# Patient Record
Sex: FEMALE | Race: WHITE | URBAN - NONMETROPOLITAN AREA
[De-identification: names, ages, dates, MRNs, and addresses within clinical notes are randomized per-mention and may not be internally consistent; named-entity substitution may affect disease eponyms.]

---

## 2022-05-10 ENCOUNTER — HOSPITAL ENCOUNTER (EMERGENCY)
Facility: HOSPITAL | Age: 52
Discharge: HOME | End: 2022-05-10
Attending: NURSE PRACTITIONER | Admitting: NURSE PRACTITIONER
Payer: COMMERCIAL

## 2022-05-10 VITALS
OXYGEN SATURATION: 95 % | DIASTOLIC BLOOD PRESSURE: 64 MMHG | SYSTOLIC BLOOD PRESSURE: 93 MMHG | TEMPERATURE: 98.9 F | RESPIRATION RATE: 16 BRPM | HEART RATE: 93 BPM

## 2022-05-10 DIAGNOSIS — J02.0 STREPTOCOCCAL SORE THROAT: ICD-10-CM

## 2022-05-10 LAB — GROUP A STREP BY PCR: DETECTED

## 2022-05-10 PROCEDURE — 99213 OFFICE O/P EST LOW 20 MIN: CPT | Performed by: NURSE PRACTITIONER

## 2022-05-10 PROCEDURE — 87651 STREP A DNA AMP PROBE: CPT | Performed by: NURSE PRACTITIONER

## 2022-05-10 PROCEDURE — G0463 HOSPITAL OUTPT CLINIC VISIT: HCPCS

## 2022-05-10 PROCEDURE — 87651 STREP A DNA AMP PROBE: CPT | Performed by: EMERGENCY MEDICINE

## 2022-05-10 ASSESSMENT — ENCOUNTER SYMPTOMS
CARDIOVASCULAR NEGATIVE: 1
NEUROLOGICAL NEGATIVE: 1
HEMATOLOGIC/LYMPHATIC NEGATIVE: 1
PSYCHIATRIC NEGATIVE: 1
ENDOCRINE NEGATIVE: 1
SORE THROAT: 1
CONSTITUTIONAL NEGATIVE: 1
VOMITING: 1
EYES NEGATIVE: 1
ALLERGIC/IMMUNOLOGIC NEGATIVE: 1
MUSCULOSKELETAL NEGATIVE: 1
RESPIRATORY NEGATIVE: 1

## 2022-05-10 NOTE — ED TRIAGE NOTES
Pateint comes to  w/c of a sore throat that started yesterday. Currently she is having difficulty swallowing and feeling fatigued.

## 2022-05-10 NOTE — ED PROVIDER NOTES
History     Chief Complaint   Patient presents with     Pharyngitis     HPI   History of presenting illness given by patient.    Subha Amaya is a 52 year old female who presents for evaluation of sore throat, fatigue, and pain with swallowing.  Her symptoms yesterday morning and have progressively worsened.  She has not had any nausea, vomiting up until 20 minutes ago when she vomited in the garbage bag in ER room.  She denies any fevers, headaches, or abdominal pain.      Allergies:  No Known Allergies    Problem List:    There are no problems to display for this patient.       Past Medical History:    History reviewed. No pertinent past medical history.    Past Surgical History:    History reviewed. No pertinent surgical history.    Family History:    History reviewed. No pertinent family history.    Social History:  Marital Status:  Single [1]        Medications:    No current outpatient medications on file.        Review of Systems   Constitutional: Negative.    HENT: Positive for sore throat.    Eyes: Negative.    Respiratory: Negative.    Cardiovascular: Negative.    Gastrointestinal: Positive for vomiting.   Endocrine: Negative.    Genitourinary: Negative.    Musculoskeletal: Negative.    Skin: Negative.    Allergic/Immunologic: Negative.    Neurological: Negative.    Hematological: Negative.    Psychiatric/Behavioral: Negative.        Physical Exam   BP: 93/64  Pulse: 93  Temp: 98.9  F (37.2  C)  Resp: 16  SpO2: 95 %      Physical Exam  Vitals and nursing note reviewed.   Constitutional:       Appearance: She is well-developed and normal weight.   HENT:      Head: Normocephalic.      Mouth/Throat:      Mouth: Mucous membranes are moist.      Tonsils: Tonsillar exudate present. 2+ on the right. 2+ on the left.   Eyes:      Conjunctiva/sclera: Conjunctivae normal.      Pupils: Pupils are equal, round, and reactive to light.   Cardiovascular:      Rate and Rhythm: Normal rate and regular rhythm.       Heart sounds: Normal heart sounds.   Pulmonary:      Effort: Pulmonary effort is normal.      Breath sounds: Normal breath sounds.   Abdominal:      General: Bowel sounds are normal.      Palpations: Abdomen is soft.   Musculoskeletal:      Cervical back: Normal range of motion.   Lymphadenopathy:      Cervical: Cervical adenopathy present.   Skin:     General: Skin is warm and dry.   Neurological:      General: No focal deficit present.      Mental Status: She is alert and oriented to person, place, and time.   Psychiatric:         Mood and Affect: Mood normal.         Behavior: Behavior normal.         ED Course              No results found for this or any previous visit (from the past 24 hour(s)).    Medications - No data to display    Assessments & Plan (with Medical Decision Making)   Findings as above.  52-year-old female vitally stable presents with sore throat, fatigue, and pain with swallowing.  Her symptoms started yesterday and have progressively worsened.  Upon assessment she does have erythema to the back of the oropharynx, tonsils, and she also has swelling of the tonsils with exudate present.    Plan: Group a streptococcus PCR throat swab    Differential diagnosis includes and is not limited to  Streptococcal pharyngitis  Bacterial tracheitis  Infectious mononucleosis  Laryngitis    Group A strep positive.  I discussed options with patient to give oral antibiotics or 1 IM shot that is a penicillin antibiotic for treatment.  Patient chose IM shot.    I discussed the discharge plan with patient that she was given an IM antibiotic for treatment of strep throat.  Patient will gargle with salt water, use Tylenol ibuprofen for discomfort, rest, and increase fluids.  If her symptoms worsen or she develops any new concerning symptoms she has been instructed to return to the emergency room or urgent care.      I have reviewed the nursing notes.    I have reviewed the findings, diagnosis, plan and need for  follow up with the patient.      There are no discharge medications for this patient.      Final diagnoses:   Streptococcal sore throat       5/10/2022   [facility]     Candace Cuba APRN CNP  05/16/22 [id number]

## 2022-05-10 NOTE — DISCHARGE INSTRUCTIONS
Thank you for choosing [facility] for your healthcare needs today.  For your strep throat you have received penicillin G benzathine injection.  This is all the antibiotic you will need.  Perform salt water gargles, increase fluids, rest as much as needed, and use Tylenol or ibuprofen for discomfort.  Follow-up next week with your primary care provider for reevaluation of treatment.  If symptoms worsen or you develop any new concerning symptoms please return to ER.  Thank you

## 2022-05-10 NOTE — ED TRIAGE NOTES
Patient presents to emergency room with c/o sore throat. Symptoms started yesterday. C/o difficulty swallowing. C/o feeling fatigued.

## 2025-05-20 ENCOUNTER — MEDICAL CORRESPONDENCE (OUTPATIENT)
Dept: MRI IMAGING | Facility: HOSPITAL | Age: 55
End: 2025-05-20

## 2025-05-30 ENCOUNTER — HOSPITAL ENCOUNTER (OUTPATIENT)
Dept: MRI IMAGING | Facility: HOSPITAL | Age: 55
Discharge: HOME | End: 2025-05-30
Attending: SPECIALIST | Admitting: RADIOLOGY
Payer: COMMERCIAL

## 2025-05-30 DIAGNOSIS — S83.249A MEDIAL MENISCUS TEAR: ICD-10-CM

## 2025-05-30 PROCEDURE — 73721 MRI JNT OF LWR EXTRE W/O DYE: CPT | Mod: LT

## 2025-05-30 PROCEDURE — 73721 MRI JNT OF LWR EXTRE W/O DYE: CPT | Mod: 26 | Performed by: RADIOLOGY
